# Patient Record
Sex: FEMALE | Race: WHITE | URBAN - METROPOLITAN AREA
[De-identification: names, ages, dates, MRNs, and addresses within clinical notes are randomized per-mention and may not be internally consistent; named-entity substitution may affect disease eponyms.]

---

## 2017-01-05 ENCOUNTER — OFFICE VISIT (OUTPATIENT)
Dept: OTOLARYNGOLOGY | Facility: CLINIC | Age: 57
End: 2017-01-05

## 2017-01-05 DIAGNOSIS — J34.89 NASAL OBSTRUCTION: Primary | ICD-10-CM

## 2017-01-05 ASSESSMENT — PAIN SCALES - GENERAL: PAINLEVEL: MILD PAIN (2)

## 2017-01-05 NOTE — Clinical Note
1/5/2017       RE: Zay Spencer  [address] [facility] [id number]     Dear Colleague,    Thank you for referring your patient, Zay Spencer, to the [facility] at [facility]. Please see a copy of my visit note below.    HISTORY OF PRESENT ILLNESS:  Zay Spencer returns.  She is status post bilateral inferior turbinate reduction.  I also shaved off a small septal spur on the left side.  She states that she is doing very well.  She has some eye irritation today.  She has been using drops.        PHYSICAL EXAMINATION:  There are no significant findings in her eyes or conjunctiva.  Nasal examination reveals minimal crusting.  She has a slight crust on the head of the turbinate on the left and an even smaller one on the head of the turbinate on the right.  Nasal passages appear to be patent.  She still feels a little bit stuffy at times but overall, she does notice improvement in her breathing when lying down and improvement in her breathing when she is up and about throughout the day.  She is continuing to make adjustments with her CPAP mask to see if she can tolerate her CPAP therapy better.      ASSESSMENT AND PLAN:  Overall, she has an excellent result.  She will continue to follow with me as needed.  She will see her primary doctor regarding her eye irritation.  She is concerned about possibly having pinkeye.           Again, thank you for allowing me to participate in the care of your patient.      Sincerely,    Heaven Crespo MD

## 2017-01-06 NOTE — PROGRESS NOTES
HISTORY OF PRESENT ILLNESS:  Zay Spencer returns.  She is status post bilateral inferior turbinate reduction.  I also shaved off a small septal spur on the left side.  She states that she is doing very well.  She has some eye irritation today.  She has been using drops.        PHYSICAL EXAMINATION:  There are no significant findings in her eyes or conjunctiva.  Nasal examination reveals minimal crusting.  She has a slight crust on the head of the turbinate on the left and an even smaller one on the head of the turbinate on the right.  Nasal passages appear to be patent.  She still feels a little bit stuffy at times but overall, she does notice improvement in her breathing when lying down and improvement in her breathing when she is up and about throughout the day.  She is continuing to make adjustments with her CPAP mask to see if she can tolerate her CPAP therapy better.      ASSESSMENT AND PLAN:  Overall, she has an excellent result.  She will continue to follow with me as needed.  She will see her primary doctor regarding her eye irritation.  She is concerned about possibly having pinkeye.

## 2017-08-12 ENCOUNTER — HEALTH MAINTENANCE LETTER (OUTPATIENT)
Age: 57
End: 2017-08-12

## 2019-02-05 ENCOUNTER — RECORDS - HEALTHEAST (OUTPATIENT)
Dept: LAB | Facility: CLINIC | Age: 59
End: 2019-02-05

## 2019-02-06 LAB — BACTERIA SPEC CULT: NO GROWTH

## 2019-11-05 ENCOUNTER — HEALTH MAINTENANCE LETTER (OUTPATIENT)
Age: 59
End: 2019-11-05

## 2020-08-25 ENCOUNTER — RECORDS - HEALTHEAST (OUTPATIENT)
Dept: LAB | Facility: CLINIC | Age: 60
End: 2020-08-25

## 2020-08-25 LAB — TSH SERPL DL<=0.005 MIU/L-ACNC: 1.97 UIU/ML (ref 0.3–5)

## 2020-11-22 ENCOUNTER — HEALTH MAINTENANCE LETTER (OUTPATIENT)
Age: 60
End: 2020-11-22

## 2021-05-25 ENCOUNTER — RECORDS - HEALTHEAST (OUTPATIENT)
Dept: ADMINISTRATIVE | Facility: CLINIC | Age: 61
End: 2021-05-25

## 2021-05-26 ENCOUNTER — RECORDS - HEALTHEAST (OUTPATIENT)
Dept: ADMINISTRATIVE | Facility: CLINIC | Age: 61
End: 2021-05-26

## 2021-05-27 ENCOUNTER — RECORDS - HEALTHEAST (OUTPATIENT)
Dept: ADMINISTRATIVE | Facility: CLINIC | Age: 61
End: 2021-05-27

## 2021-09-19 ENCOUNTER — HEALTH MAINTENANCE LETTER (OUTPATIENT)
Age: 61
End: 2021-09-19

## 2021-11-14 ENCOUNTER — HEALTH MAINTENANCE LETTER (OUTPATIENT)
Age: 61
End: 2021-11-14

## 2022-01-09 ENCOUNTER — HEALTH MAINTENANCE LETTER (OUTPATIENT)
Age: 62
End: 2022-01-09

## 2022-11-21 ENCOUNTER — HEALTH MAINTENANCE LETTER (OUTPATIENT)
Age: 62
End: 2022-11-21

## 2023-04-16 ENCOUNTER — HEALTH MAINTENANCE LETTER (OUTPATIENT)
Age: 63
End: 2023-04-16

## 2023-11-25 ENCOUNTER — HEALTH MAINTENANCE LETTER (OUTPATIENT)
Age: 63
End: 2023-11-25